# Patient Record
Sex: MALE | Race: WHITE | NOT HISPANIC OR LATINO | Employment: FULL TIME | URBAN - METROPOLITAN AREA
[De-identification: names, ages, dates, MRNs, and addresses within clinical notes are randomized per-mention and may not be internally consistent; named-entity substitution may affect disease eponyms.]

---

## 2020-12-04 DIAGNOSIS — Z01.84 ANTIBODY RESPONSE EXAMINATION: ICD-10-CM

## 2020-12-22 DIAGNOSIS — R05.9 COUGH: Primary | ICD-10-CM

## 2020-12-22 DIAGNOSIS — R68.83 CHILLS: ICD-10-CM

## 2020-12-22 DIAGNOSIS — R52 BODY ACHES: ICD-10-CM

## 2020-12-22 DIAGNOSIS — J02.9 SORE THROAT: ICD-10-CM

## 2021-07-27 ENCOUNTER — IMMUNIZATION (OUTPATIENT)
Dept: INTERNAL MEDICINE | Facility: CLINIC | Age: 26
End: 2021-07-27
Payer: COMMERCIAL

## 2021-07-27 DIAGNOSIS — Z23 NEED FOR VACCINATION: Primary | ICD-10-CM

## 2021-07-27 PROCEDURE — 91300 COVID-19, MRNA, LNP-S, PF, 30 MCG/0.3 ML DOSE VACCINE: CPT | Mod: PBBFAC

## 2021-08-19 ENCOUNTER — IMMUNIZATION (OUTPATIENT)
Dept: INTERNAL MEDICINE | Facility: CLINIC | Age: 26
End: 2021-08-19
Payer: OTHER GOVERNMENT

## 2021-08-19 DIAGNOSIS — Z23 NEED FOR VACCINATION: Primary | ICD-10-CM

## 2021-08-19 PROCEDURE — 91300 COVID-19, MRNA, LNP-S, PF, 30 MCG/0.3 ML DOSE VACCINE: CPT | Mod: ,,, | Performed by: INTERNAL MEDICINE

## 2021-08-19 PROCEDURE — 0002A COVID-19, MRNA, LNP-S, PF, 30 MCG/0.3 ML DOSE VACCINE: CPT | Mod: CV19,,, | Performed by: INTERNAL MEDICINE

## 2021-08-19 PROCEDURE — 91300 COVID-19, MRNA, LNP-S, PF, 30 MCG/0.3 ML DOSE VACCINE: ICD-10-PCS | Mod: ,,, | Performed by: INTERNAL MEDICINE

## 2021-08-19 PROCEDURE — 0002A COVID-19, MRNA, LNP-S, PF, 30 MCG/0.3 ML DOSE VACCINE: ICD-10-PCS | Mod: CV19,,, | Performed by: INTERNAL MEDICINE

## 2021-11-29 ENCOUNTER — CLINICAL SUPPORT (OUTPATIENT)
Dept: CARDIOLOGY | Facility: HOSPITAL | Age: 26
End: 2021-11-29
Attending: NURSE PRACTITIONER
Payer: COMMERCIAL

## 2021-11-29 DIAGNOSIS — R00.1 BRADYCARDIA, UNSPECIFIED: ICD-10-CM

## 2021-11-29 PROCEDURE — 93227 HOLTER MONITOR - 48 HOUR (CUPID ONLY): ICD-10-PCS | Mod: ,,, | Performed by: STUDENT IN AN ORGANIZED HEALTH CARE EDUCATION/TRAINING PROGRAM

## 2021-11-29 PROCEDURE — 93226 XTRNL ECG REC<48 HR SCAN A/R: CPT

## 2021-11-29 PROCEDURE — 93227 XTRNL ECG REC<48 HR R&I: CPT | Mod: ,,, | Performed by: STUDENT IN AN ORGANIZED HEALTH CARE EDUCATION/TRAINING PROGRAM

## 2021-12-09 LAB
OHS CV EVENT MONITOR DAY: 0
OHS CV HOLTER LENGTH DECIMAL HOURS: 47.98
OHS CV HOLTER LENGTH HOURS: 47
OHS CV HOLTER LENGTH MINUTES: 59
OHS CV HOLTER SINUS AVERAGE HR: 73
OHS CV HOLTER SINUS MAX HR: 164
OHS CV HOLTER SINUS MIN HR: 41

## 2022-01-07 ENCOUNTER — TELEPHONE (OUTPATIENT)
Dept: ENDOSCOPY | Facility: HOSPITAL | Age: 27
End: 2022-01-07
Payer: COMMERCIAL

## 2022-01-10 ENCOUNTER — PATIENT MESSAGE (OUTPATIENT)
Dept: ENDOSCOPY | Facility: HOSPITAL | Age: 27
End: 2022-01-10
Payer: COMMERCIAL

## 2022-01-10 ENCOUNTER — TELEPHONE (OUTPATIENT)
Dept: ENDOSCOPY | Facility: HOSPITAL | Age: 27
End: 2022-01-10
Payer: COMMERCIAL

## 2022-01-21 ENCOUNTER — OFFICE VISIT (OUTPATIENT)
Dept: GASTROENTEROLOGY | Facility: CLINIC | Age: 27
End: 2022-01-21
Payer: COMMERCIAL

## 2022-01-21 ENCOUNTER — PATIENT MESSAGE (OUTPATIENT)
Dept: GASTROENTEROLOGY | Facility: CLINIC | Age: 27
End: 2022-01-21

## 2022-01-21 VITALS
DIASTOLIC BLOOD PRESSURE: 79 MMHG | HEART RATE: 78 BPM | HEIGHT: 70 IN | BODY MASS INDEX: 28.66 KG/M2 | SYSTOLIC BLOOD PRESSURE: 129 MMHG | WEIGHT: 200.19 LBS

## 2022-01-21 DIAGNOSIS — R14.3 FLATULENCE: ICD-10-CM

## 2022-01-21 DIAGNOSIS — Z80.0 FAMILY HISTORY OF COLON CANCER: ICD-10-CM

## 2022-01-21 DIAGNOSIS — Z83.719 FAMILY HISTORY OF COLONIC POLYPS: ICD-10-CM

## 2022-01-21 DIAGNOSIS — R14.0 BLOATING: Primary | ICD-10-CM

## 2022-01-21 PROCEDURE — 99999 PR PBB SHADOW E&M-EST. PATIENT-LVL III: CPT | Mod: PBBFAC,,, | Performed by: INTERNAL MEDICINE

## 2022-01-21 PROCEDURE — 99999 PR PBB SHADOW E&M-EST. PATIENT-LVL III: ICD-10-PCS | Mod: PBBFAC,,, | Performed by: INTERNAL MEDICINE

## 2022-01-21 PROCEDURE — 3008F PR BODY MASS INDEX (BMI) DOCUMENTED: ICD-10-PCS | Mod: CPTII,S$GLB,, | Performed by: INTERNAL MEDICINE

## 2022-01-21 PROCEDURE — 1160F PR REVIEW ALL MEDS BY PRESCRIBER/CLIN PHARMACIST DOCUMENTED: ICD-10-PCS | Mod: CPTII,S$GLB,, | Performed by: INTERNAL MEDICINE

## 2022-01-21 PROCEDURE — 3008F BODY MASS INDEX DOCD: CPT | Mod: CPTII,S$GLB,, | Performed by: INTERNAL MEDICINE

## 2022-01-21 PROCEDURE — 3078F DIAST BP <80 MM HG: CPT | Mod: CPTII,S$GLB,, | Performed by: INTERNAL MEDICINE

## 2022-01-21 PROCEDURE — 1160F RVW MEDS BY RX/DR IN RCRD: CPT | Mod: CPTII,S$GLB,, | Performed by: INTERNAL MEDICINE

## 2022-01-21 PROCEDURE — 99203 OFFICE O/P NEW LOW 30 MIN: CPT | Mod: S$GLB,,, | Performed by: INTERNAL MEDICINE

## 2022-01-21 PROCEDURE — 1159F PR MEDICATION LIST DOCUMENTED IN MEDICAL RECORD: ICD-10-PCS | Mod: CPTII,S$GLB,, | Performed by: INTERNAL MEDICINE

## 2022-01-21 PROCEDURE — 99203 PR OFFICE/OUTPT VISIT, NEW, LEVL III, 30-44 MIN: ICD-10-PCS | Mod: S$GLB,,, | Performed by: INTERNAL MEDICINE

## 2022-01-21 PROCEDURE — 1159F MED LIST DOCD IN RCRD: CPT | Mod: CPTII,S$GLB,, | Performed by: INTERNAL MEDICINE

## 2022-01-21 PROCEDURE — 3074F SYST BP LT 130 MM HG: CPT | Mod: CPTII,S$GLB,, | Performed by: INTERNAL MEDICINE

## 2022-01-21 PROCEDURE — 3074F PR MOST RECENT SYSTOLIC BLOOD PRESSURE < 130 MM HG: ICD-10-PCS | Mod: CPTII,S$GLB,, | Performed by: INTERNAL MEDICINE

## 2022-01-21 PROCEDURE — 3078F PR MOST RECENT DIASTOLIC BLOOD PRESSURE < 80 MM HG: ICD-10-PCS | Mod: CPTII,S$GLB,, | Performed by: INTERNAL MEDICINE

## 2022-01-21 NOTE — PROGRESS NOTES
[facility] Consultation Note    Reason for Consult:    Chief Complaint   Patient presents with    Gas    GI Problem     Est care       PCP:   Primary Doctor No    Referring MD:  Lennox Self  No address on file      HPI:  Luciano Smyth is a 26 y.o. male here for evaluation of gas and family history of colon cancer.  He is new to our clinic.  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  He reports a several year history of gas and bloating.  He belches a lot and passes a lot of gas.  He feels this is and absurd amount which occurs on a daily basis.  It can be uncomfortable at times.  Symptoms are worse after eating, but he has not identified any specific food groups that exacerbate symptoms.  He does not consume much dairy products.  He occasionally gets a dull ache just lateral of his umbilicus, rated as a 2/10 in intensity, that lasts a couple of minutes at a time.  This occurs about once weekly.  This does not seem to be related to his bowel movements.  He denies a history of constipation or diarrhea.  He also has a mild rectal discomfort right before bowel movements.  He has bowel movements he were from once daily to several times a day.  He usually has a bowel movement stimulated by drinking coffee.  Quality of his stools is Eveleth scale 3-4.  He denies any other significant GI complaints.  He has rare episodes of heartburn.  He wakes up with phlegm in his throat on a frequent basis.    As for his family history of colon cancer.  His paternal grandfather was diagnosed and  from colon cancer in his 40s.  No other family members have had colon cancer.  He is not aware of any other types of cancers that run in the family.  His father had early screening with a colonoscopy in his 30s that revealed 5 adenomatous polyps.  He does not know any more details about these polyps.  His father has reportedly had no further polyps since that 1st colonoscopy.  His father  "advised him that he may need earlier screening.        ROS:  Constitutional: No fevers, chills, No weight loss, normal appetite  ENT: No congestion, rhinorrhea, or chronic sinus problems  Pulm:  Reports a childhood history of asthma, but has not had problems since age 16.   GI: see HPI  MSK:  problems with stiffness in both of his hands, but no other joint swelling or pain  : No dysuria, No frequent urination      Medical History:  has no past medical history on file.    Surgical History:  has a past surgical history that includes Bladder surgery.    Family History: family history includes Colon cancer in his paternal grandfather; Colon polyps in his father; Dementia in his maternal grandfather; No Known Problems in his mother; Rheum arthritis in his maternal grandmother.    Social History:  reports that he has quit smoking. He has never used smokeless tobacco. He reports current alcohol use. He reports that he does not use drugs.    Review of patient's allergies indicates:   Allergen Reactions    Doxycycline Nausea And Vomiting       Prior to Admission medications    Medication Sig Start Date End Date Taking? Authorizing Provider   calcium carbonate (TUMS ORAL) Take by mouth.   Yes Historical Provider   simethicone (GAS-X ORAL) Take by mouth.   Yes Historical Provider       Objective Findings:  Vital Signs:  /79   Pulse 78   Ht 5' 9.5" (1.765 m)   Wt 90.8 kg (200 lb 2.8 oz)   BMI 29.14 kg/m²   Body mass index is 29.14 kg/m².      Physical Exam:  General Appearance:  Well appearing in no acute distress, appears stated age  Head:  Normocephalic, atraumatic  Eyes:  No scleral icterus or pallor, EOMI        Labs:  None available for review, but he reports having routine labs by his primary care provider in February of 2021. He reports no significant abnormalities.                Assessment:  Luciano Smyth is a 26 y.o. male with:  1. Bloating    2. Flatulence    3. Family history of colon cancer    4. " Family history of colonic polyps      His main complaint of bloating, intestinal gas, and flatulence could be related to underlying factors with digestion or bacteria may complete his GI tract.  SIBO and intestinal dysbiosis are considerations.  Carbohydrate maldigestion is another consideration.  Celiac disease and H pylori should be ruled out.    As for his family history of colon cancer and colon polyps, he may very well benefit from early screening.  The history of colon cancer alone, in a single 2nd degree relative, does not necessarily qualify for early screening.  However, the addition of his father with colon polyps at a very young age, in his 30s, could indicate increased risk.        Recommendations/Plan:  1. Order placed for H pylori stool sampling.  2. I recommend celiac serology.  He prefers to check with his PCP about other labs needed for routine yearly screening, and will ask his PCP to add this on.  3. I will send in paperwork for the home performed, glucose SIBO breath test done by [employer].    4. May consider a course of the probiotic Align for 4-6 weeks.  5. Colonoscopy at the age of 29 for screening purposes.      Follow-up as needed in GI clinic.      Order summary:  Orders Placed This Encounter    H. pylori antigen, stool         Thank you so much for allowing me to participate in the care of Luciano Smyth      Visit time 30 minutes with more than 50% time spent counseling and discussing colon cancer screening as well as treatment for bloating.        Renato Parr MD

## 2022-01-27 ENCOUNTER — LAB VISIT (OUTPATIENT)
Dept: LAB | Facility: HOSPITAL | Age: 27
End: 2022-01-27
Payer: COMMERCIAL

## 2022-01-27 DIAGNOSIS — R14.3 FLATULENCE: ICD-10-CM

## 2022-01-27 DIAGNOSIS — R14.0 BLOATING: ICD-10-CM

## 2022-01-27 PROCEDURE — 87338 HPYLORI STOOL AG IA: CPT | Performed by: INTERNAL MEDICINE

## 2022-02-06 LAB
H PYLORI AG STL QL IA: NORMAL
SPECIMEN SOURCE: NORMAL

## 2022-02-17 ENCOUNTER — OFFICE VISIT (OUTPATIENT)
Dept: OPTOMETRY | Facility: CLINIC | Age: 27
End: 2022-02-17
Payer: COMMERCIAL

## 2022-02-17 DIAGNOSIS — H52.13 MYOPIA, BILATERAL: Primary | ICD-10-CM

## 2022-02-17 DIAGNOSIS — Z46.0 FITTING AND ADJUSTMENT OF SPECTACLES AND CONTACT LENSES: Primary | ICD-10-CM

## 2022-02-17 DIAGNOSIS — Z04.9 DISEASE RULED OUT AFTER EXAMINATION: ICD-10-CM

## 2022-02-17 PROCEDURE — 92004 PR EYE EXAM, NEW PATIENT,COMPREHESV: ICD-10-PCS | Mod: S$GLB,,, | Performed by: OPTOMETRIST

## 2022-02-17 PROCEDURE — 99999 PR PBB SHADOW E&M-EST. PATIENT-LVL II: CPT | Mod: PBBFAC,,, | Performed by: OPTOMETRIST

## 2022-02-17 PROCEDURE — 92004 COMPRE OPH EXAM NEW PT 1/>: CPT | Mod: S$GLB,,, | Performed by: OPTOMETRIST

## 2022-02-17 PROCEDURE — 92310 CONTACT LENS FITTING OU: CPT | Mod: S$GLB,,, | Performed by: OPTOMETRIST

## 2022-02-17 PROCEDURE — 92015 DETERMINE REFRACTIVE STATE: CPT | Mod: S$GLB,,, | Performed by: OPTOMETRIST

## 2022-02-17 PROCEDURE — 92015 PR REFRACTION: ICD-10-PCS | Mod: S$GLB,,, | Performed by: OPTOMETRIST

## 2022-02-17 PROCEDURE — 99999 PR PBB SHADOW E&M-EST. PATIENT-LVL II: ICD-10-PCS | Mod: PBBFAC,,, | Performed by: OPTOMETRIST

## 2022-02-17 PROCEDURE — 92310 PR CONTACT LENS FITTING (NO CHANGE): ICD-10-PCS | Mod: S$GLB,,, | Performed by: OPTOMETRIST

## 2022-02-17 NOTE — PROGRESS NOTES
HPI     Annual eye exam / Update glasses and CL fitting   JUANI 2-3 years ago ?  Current glasses 2-3 years old distance vision seems to be getting a little   more difficult.  Hx of Av 1 day Moist -4.00 BC 8.5 D/c use mainly due to comfort   Denies pain     (-)Flashes (-)Floaters  (+)Itch, (-)tear, (-)burn, (+)Dryness.  (-) OTC Drops not usually but does   use Saline rarely if eyes have discomfort   (-)Photophobia  (-)Glare      No past eye sx   FamOhx : none     Last edited by Ginger Connolly on 2/17/2022 10:32 AM. (History)            Assessment /Plan     For exam results, see Encounter Report.    Myopia, bilateral    Disease ruled out after examination      Rx specs  CL fit today: dispensed trials of dailies total one OU  Remove nightly, replace daily  Ok to order if happy with vision and comfort OU    Good overall ocular health    RTC 1 year

## 2022-08-02 ENCOUNTER — PATIENT MESSAGE (OUTPATIENT)
Dept: OPTOMETRY | Facility: CLINIC | Age: 27
End: 2022-08-02
Payer: COMMERCIAL